# Patient Record
Sex: MALE | Race: WHITE | NOT HISPANIC OR LATINO | URBAN - METROPOLITAN AREA
[De-identification: names, ages, dates, MRNs, and addresses within clinical notes are randomized per-mention and may not be internally consistent; named-entity substitution may affect disease eponyms.]

---

## 2017-01-31 ENCOUNTER — EMERGENCY (EMERGENCY)
Facility: HOSPITAL | Age: 42
LOS: 0 days | Discharge: HOME | End: 2017-01-31
Attending: EMERGENCY MEDICINE
Payer: SELF-PAY

## 2017-01-31 VITALS
HEART RATE: 99 BPM | RESPIRATION RATE: 18 BRPM | WEIGHT: 156.97 LBS | HEIGHT: 67 IN | SYSTOLIC BLOOD PRESSURE: 139 MMHG | TEMPERATURE: 97 F | DIASTOLIC BLOOD PRESSURE: 103 MMHG | OXYGEN SATURATION: 97 %

## 2017-01-31 VITALS
TEMPERATURE: 97 F | OXYGEN SATURATION: 100 % | HEART RATE: 86 BPM | DIASTOLIC BLOOD PRESSURE: 95 MMHG | RESPIRATION RATE: 17 BRPM | SYSTOLIC BLOOD PRESSURE: 143 MMHG

## 2017-01-31 DIAGNOSIS — F10.129 ALCOHOL ABUSE WITH INTOXICATION, UNSPECIFIED: ICD-10-CM

## 2017-01-31 DIAGNOSIS — F19.10 OTHER PSYCHOACTIVE SUBSTANCE ABUSE, UNCOMPLICATED: ICD-10-CM

## 2017-01-31 DIAGNOSIS — R69 ILLNESS, UNSPECIFIED: ICD-10-CM

## 2017-01-31 DIAGNOSIS — R45.1 RESTLESSNESS AND AGITATION: ICD-10-CM

## 2017-01-31 DIAGNOSIS — F19.94 OTHER PSYCHOACTIVE SUBSTANCE USE, UNSPECIFIED WITH PSYCHOACTIVE SUBSTANCE-INDUCED MOOD DISORDER: ICD-10-CM

## 2017-01-31 LAB
ALBUMIN SERPL ELPH-MCNC: 4.4 G/DL — SIGNIFICANT CHANGE UP (ref 3.3–5)
ALP SERPL-CCNC: 38 U/L — LOW (ref 40–120)
ALT FLD-CCNC: 33 U/L — SIGNIFICANT CHANGE UP (ref 12–78)
AMPHET UR-MCNC: NEGATIVE — SIGNIFICANT CHANGE UP
ANION GAP SERPL CALC-SCNC: 8 MMOL/L — SIGNIFICANT CHANGE UP (ref 5–17)
APAP SERPL-MCNC: < 2 UG/ML (ref 10–30)
APTT BLD: 29.9 SEC — SIGNIFICANT CHANGE UP (ref 27.5–37.4)
AST SERPL-CCNC: 14 U/L — LOW (ref 15–37)
BARBITURATES UR SCN-MCNC: NEGATIVE — SIGNIFICANT CHANGE UP
BASOPHILS # BLD AUTO: 0.1 K/UL — SIGNIFICANT CHANGE UP (ref 0–0.2)
BASOPHILS NFR BLD AUTO: 1.5 % — SIGNIFICANT CHANGE UP (ref 0–2)
BENZODIAZ UR-MCNC: NEGATIVE — SIGNIFICANT CHANGE UP
BILIRUB SERPL-MCNC: 0.5 MG/DL — SIGNIFICANT CHANGE UP (ref 0.2–1.2)
BUN SERPL-MCNC: 7 MG/DL — SIGNIFICANT CHANGE UP (ref 7–23)
CALCIUM SERPL-MCNC: 8.4 MG/DL — LOW (ref 8.5–10.1)
CHLORIDE SERPL-SCNC: 107 MMOL/L — SIGNIFICANT CHANGE UP (ref 96–108)
CO2 SERPL-SCNC: 27 MMOL/L — SIGNIFICANT CHANGE UP (ref 22–31)
COCAINE METAB.OTHER UR-MCNC: POSITIVE — SIGNIFICANT CHANGE UP
CREAT SERPL-MCNC: 0.84 MG/DL — SIGNIFICANT CHANGE UP (ref 0.5–1.3)
EOSINOPHIL # BLD AUTO: 0.2 K/UL — SIGNIFICANT CHANGE UP (ref 0–0.5)
EOSINOPHIL NFR BLD AUTO: 2 % — SIGNIFICANT CHANGE UP (ref 0–6)
ETHANOL SERPL-MCNC: 197 MG/DL — HIGH (ref 0–10)
GLUCOSE SERPL-MCNC: 101 MG/DL — HIGH (ref 70–99)
HCT VFR BLD CALC: 41.4 % — SIGNIFICANT CHANGE UP (ref 39–50)
HGB BLD-MCNC: 15.5 G/DL — SIGNIFICANT CHANGE UP (ref 13–17)
INR BLD: 0.96 RATIO — SIGNIFICANT CHANGE UP (ref 0.88–1.16)
LYMPHOCYTES # BLD AUTO: 2.8 K/UL — SIGNIFICANT CHANGE UP (ref 1–3.3)
LYMPHOCYTES # BLD AUTO: 35.1 % — SIGNIFICANT CHANGE UP (ref 13–44)
MCHC RBC-ENTMCNC: 32.1 PG — SIGNIFICANT CHANGE UP (ref 27–34)
MCHC RBC-ENTMCNC: 37.3 GM/DL — HIGH (ref 32–36)
MCV RBC AUTO: 85.9 FL — SIGNIFICANT CHANGE UP (ref 80–100)
METHADONE UR-MCNC: NEGATIVE — SIGNIFICANT CHANGE UP
MONOCYTES # BLD AUTO: 0.7 K/UL — SIGNIFICANT CHANGE UP (ref 0–0.9)
MONOCYTES NFR BLD AUTO: 8.4 % — SIGNIFICANT CHANGE UP (ref 2–14)
NEUTROPHILS # BLD AUTO: 4.3 K/UL — SIGNIFICANT CHANGE UP (ref 1.8–7.4)
NEUTROPHILS NFR BLD AUTO: 53 % — SIGNIFICANT CHANGE UP (ref 43–77)
OPIATES UR-MCNC: NEGATIVE — SIGNIFICANT CHANGE UP
PCP SPEC-MCNC: SIGNIFICANT CHANGE UP
PCP UR-MCNC: NEGATIVE — SIGNIFICANT CHANGE UP
PLATELET # BLD AUTO: 283 K/UL — SIGNIFICANT CHANGE UP (ref 150–400)
POTASSIUM SERPL-MCNC: 3.9 MMOL/L — SIGNIFICANT CHANGE UP (ref 3.5–5.3)
POTASSIUM SERPL-SCNC: 3.9 MMOL/L — SIGNIFICANT CHANGE UP (ref 3.5–5.3)
PROT SERPL-MCNC: 7.8 GM/DL — SIGNIFICANT CHANGE UP (ref 6–8.3)
PROTHROM AB SERPL-ACNC: 10.8 SEC — SIGNIFICANT CHANGE UP (ref 10–13.1)
RBC # BLD: 4.82 M/UL — SIGNIFICANT CHANGE UP (ref 4.2–5.8)
RBC # FLD: 11 % — SIGNIFICANT CHANGE UP (ref 11–15)
SALICYLATES SERPL-MCNC: <1.7 MG/DL — LOW (ref 2.8–20)
SODIUM SERPL-SCNC: 142 MMOL/L — SIGNIFICANT CHANGE UP (ref 135–145)
THC UR QL: NEGATIVE — SIGNIFICANT CHANGE UP
WBC # BLD: 8.1 K/UL — SIGNIFICANT CHANGE UP (ref 3.8–10.5)
WBC # FLD AUTO: 8.1 K/UL — SIGNIFICANT CHANGE UP (ref 3.8–10.5)

## 2017-01-31 PROCEDURE — 99284 EMERGENCY DEPT VISIT MOD MDM: CPT

## 2017-01-31 PROCEDURE — 90792 PSYCH DIAG EVAL W/MED SRVCS: CPT

## 2017-01-31 RX ORDER — SODIUM CHLORIDE 9 MG/ML
1000 INJECTION INTRAMUSCULAR; INTRAVENOUS; SUBCUTANEOUS ONCE
Qty: 0 | Refills: 0 | Status: COMPLETED | OUTPATIENT
Start: 2017-01-31 | End: 2017-01-31

## 2017-01-31 RX ADMIN — SODIUM CHLORIDE 1000 MILLILITER(S): 9 INJECTION INTRAMUSCULAR; INTRAVENOUS; SUBCUTANEOUS at 09:05

## 2017-01-31 RX ADMIN — Medication 2 MILLIGRAM(S): at 09:04

## 2017-01-31 NOTE — ED BEHAVIORAL HEALTH ASSESSMENT NOTE - DESCRIPTION (FIRST USE, LAST USE, QUANTITY, FREQUENCY, DURATION)
describes himself as a social drinker, denies any history of current past abuse, last drink in AM while on plane

## 2017-01-31 NOTE — ED ADULT TRIAGE NOTE - CHIEF COMPLAINT QUOTE
Sent by port authority police for agitated behavior while on airplane. Patient just traveled from [place] to [place]   Patient admits to having 2 glasses of wine and Ambien.

## 2017-01-31 NOTE — ED BEHAVIORAL HEALTH ASSESSMENT NOTE - SUMMARY
Pt is a yo single, domiciled, employed, WM with a history of anxiety and no previous inpt psychiatric hospitalization. Pt was BIB EMS secondary to bizarre, agitated behavior while on his flight from [place] to [facility] - he was apprehended in [facility] and presents for an evaluation. In ER, initially pt   requiring prn medications. However, upon evaluation with writer, pt is calm and cooperative. He denies having any recollection of events that lead to being brought into hospital.  Pt l Pt is a 42 yo single, domiciled, employed, WM with a history of anxiety and no previous inpt psychiatric hospitalization. Pt was BIB EMS secondary to bizarre, agitated behavior while on his flight from [place] to [facility], additionally he was reported to be aggressive towards other passengers in the airport- he was apprehended in [facility] by [employer] and presented to [facility] ER for an evaluation. In ER, initially pt was crying, very anxious  requiring prn medication - IV Ativan 2 mg x 1 dose was given. However, upon evaluation with writer, pt is calm and cooperative. He denies having any recollection of events that lead to being brought into hospital.  Pt has no history of s/h i/i/p - no history of SA, currently denies any plans to harm self/others.  It appears that combination of etoh and Ambien altered his behavior. Pt encouraged to not consume etoh while on that or any other medication. He does not present an acute danger to self/others and acute inpt psychiatric hospitalization is not warranted.

## 2017-01-31 NOTE — ED PROVIDER NOTE - PROGRESS NOTE DETAILS
Dr. Peace psychiatrist cleared pt Pt is alert and oriented x 3 smiling denies any harmful thoughts to himself or others, denies headache, dizziness, sob, chest pain, nausea, vomiting, fever, chills, abd pain.

## 2017-01-31 NOTE — ED BEHAVIORAL HEALTH ASSESSMENT NOTE - HPI (INCLUDE ILLNESS QUALITY, SEVERITY, DURATION, TIMING, CONTEXT, MODIFYING FACTORS, ASSOCIATED SIGNS AND SYMPTOMS)
Pt is a yo single, domiciled, employed, WM with a history of anxiety and no previous inpt psychiatric hospitalization. Pt was BIB EMS secondary to bizarre, agitated behavior while on his flight from [place] to [facility] - he was apprehended in [facility] and presents for an evaluation. In ER, pt remained agitated, crying, confused and was given.  Pt feel asleep.  Upon evaluation, pt states that he has no recollection of events that lead him to be brought to the hospital. He states that he drank a few glasses of wine during the flight and may have had 1 or 2 ambiens in order to sleep.  States that he believes that flight attendants were on edge as he did "nothing wrong." He admits to a history of anxiety - states that he has never been prescribed an SSRI and treats his anxiety with yoga and exercise. States that he sees a psychiatrist "once in a blue moon" in order to obtain Ambien which he took as needed. He denies current s/s of flor/depression/psychosis/anxiety. Denies s/h i/i/p. Denies current/past substance use/abuse. Denies legal history.  Initially reluctant to have writer contact a collateral source as he believes current episode is out of character and he doesn't wish to alarm family members, however he ultimately gave writer name/number of friend David Dennis - childhood friend since age 12  - reports that she speaks to pt often. She confirms that Ambien is prescribed by his psychiatrist. States that pt has no history of psych hospitalizations. notes that he works and is a "good steven." She expressed that at times pt drinks too much, however reported that he has not history of treatment for etoh and to her knowledge no history of DWI/DUI.  Note that when writer questioned pt about etoh, he denied drinking in excess - reported that he doesn't have a drink first thing in the morning, denied that drinking in anyway interferes with his life, denies requiring detox or any s/s of withdrawal. Pt is a 40 yo single, domiciled, employed, WM with a history of anxiety and no previous inpt psychiatric hospitalization. Pt was BIB EMS secondary to bizarre, agitated behavior while on his flight from [place] to [facility], additionally he was reported to be aggressive towards other passengers in the airport- he was apprehended in [facility] by [employer] and presented to [facility] ER for an evaluation. In ER, initially pt was crying, very anxious  requiring prn medication - IV Ativan 2 mg x 1 dose was given.  Pt fell asleep.  Upon evaluation, pt states that he has no recollection of events that lead him to be brought to the hospital. He states that he drank a few glasses of wine during the flight and may have had 1 or 2 ambiens in order to sleep.  States that he believes that flight attendants were on edge as he did "nothing wrong." He admits to a history of anxiety - states that he has never been prescribed an SSRI and treats his anxiety with yoga and exercise. States that he sees a psychiatrist "once in a blue moon" in order to obtain Ambien which he took as needed. He denies current s/s of flor/depression/psychosis/anxiety. Denies s/h i/i/p. Denies current/past substance use/abuse. Denies legal history.  Initially reluctant to have writer contact a collateral source as he believes current episode is out of character and he doesn't wish to alarm family members, however he ultimately gave writer name/number of friend David Dennis - childhood friend since age 12  - reports that she speaks to pt often. She confirms that Ambien is prescribed by his psychiatrist. States that pt has no history of psych hospitalizations. notes that he works and is a "good steven." She expressed that at times pt drinks too much, however reported that he has not history of treatment for etoh and to her knowledge no history of DWI/DUI.  Note that when writer questioned pt about etoh, he denied drinking in excess - reported that he doesn't have a drink first thing in the morning, denied that drinking in anyway interferes with his life, denies requiring detox or any s/s of withdrawal.

## 2017-01-31 NOTE — ED BEHAVIORAL HEALTH ASSESSMENT NOTE - OTHER PAST PSYCHIATRIC HISTORY (INCLUDE DETAILS REGARDING ONSET, COURSE OF ILLNESS, INPATIENT/OUTPATIENT TREATMENT)
reports a history of anxiety which he treats with yoga and exercise. States that he is on Ambien for insomnia - 5 mg.

## 2017-01-31 NOTE — ED BEHAVIORAL HEALTH ASSESSMENT NOTE - DESCRIPTION
Initially agitated requiring prn medications. denies pt was born [place], he is an only child. Completed college and moved in [place], [place] 5 years ago - currently employed as a manager at a seasonal restaurant

## 2017-01-31 NOTE — ED BEHAVIORAL HEALTH ASSESSMENT NOTE - SUICIDE PROTECTIVE FACTORS
Responsibility to family and others/Identifies reasons for living/Future oriented/Supportive social network or family

## 2017-01-31 NOTE — ED ADULT NURSE NOTE - OBJECTIVE STATEMENT
patient received, agitated, crying, very anxious, patient somewhat uncooperative. verbalized increased anxiety "a lot of things happening to the world" placed on 1:1 denies hurting himself or others. no suicidal thoughts.

## 2017-01-31 NOTE — ED ADULT NURSE NOTE - CHIEF COMPLAINT QUOTE
Sent by port authority police for agitated behavior while on airplane. Patient just traveled from [place] to [place]   Patient admits to having 2 glasses of wine and Ambien.

## 2017-01-31 NOTE — ED PROVIDER NOTE - CONSTITUTIONAL, MLM
normal... Well appearing, well nourished, awake, alert, oriented to person, place, time/situation and in no apparent distress. Speaking in clear full sentences tearful

## 2017-01-31 NOTE — ED BEHAVIORAL HEALTH ASSESSMENT NOTE - RISK ASSESSMENT
pt presents a low risk of self harm - he has no history of s/h i/i/p.  no hx of SA.  He denies substance abuse, however there is a question of misuse of medications with alcohol. Multiple protective factors - current employment, family/friend supports

## 2017-01-31 NOTE — ED PROVIDER NOTE - CARE PLAN
Principal Discharge DX:	Alcohol intoxication Principal Discharge DX:	Alcohol intoxication  Secondary Diagnosis:	Substance abuse

## 2017-01-31 NOTE — ED BEHAVIORAL HEALTH ASSESSMENT NOTE - NS ED [facility] ED COURSE PSYCHIATRIC MEDICATION GIVEN
Involuntary Intramuscular (indication, name, dose, time) Voluntary Intramuscular (indication, name, dose, time)

## 2018-10-01 NOTE — ED PROVIDER NOTE - NS ED MD EM SELECTION

## 2020-01-30 NOTE — ED PROVIDER NOTE - OBJECTIVE STATEMENT
Attempted to reach patient for: No contact made with patient since end of November. She has returned calls to clinic multiple times, but not answering return calls or directly calling [facility]. Unable to coordinate care due to lack of engagement.    Outcome:no answer; voicemail left    Next Follow Up:Will keep encounter open for 3 days, then close case    VM left with patient indicating need for active engagement for ongoing care coordination. Plan for discharge/close.      41 years old male here by the port authority from the air [facility]\"" because pt was aggressive towards  when the flight landed. According to the [employer] pt was drinking luke and taking a pill Pt sts he took ambient with wine. According to the ems pt was also aggressive toward other passenger at the air port. Pt here sts he does not remember what happened but sts he does not want to harm any one or himself. Pt is requesting ativan to calm himself. Pt sts he flew here from [place] arrived here this morning. Pt denies hx of psychiatric problem, dizziness, headache, cough, sob, chest pain, nausea, vomiting, fever, chills, abd pain. Pt is crying sts he does not know he is here with \[facility]\"" authority and he is feeling very anxious.

## 2020-05-05 NOTE — ED PROVIDER NOTE - SECONDARY DIAGNOSIS.
Personalized Preventive Plan for Tanner Ready - 5/5/2020  Medicare offers a range of preventive health benefits. Some of the tests and screenings are paid in full while other may be subject to a deductible, co-insurance, and/or copay. Some of these benefits include a comprehensive review of your medical history including lifestyle, illnesses that may run in your family, and various assessments and screenings as appropriate. After reviewing your medical record and screening and assessments performed today your provider may have ordered immunizations, labs, imaging, and/or referrals for you. A list of these orders (if applicable) as well as your Preventive Care list are included within your After Visit Summary for your review. Other Preventive Recommendations:    · A preventive eye exam performed by an eye specialist is recommended every 1-2 years to screen for glaucoma; cataracts, macular degeneration, and other eye disorders. · A preventive dental visit is recommended every 6 months. · Try to get at least 150 minutes of exercise per week or 10,000 steps per day on a pedometer . · Order or download the FREE \"Exercise & Physical Activity: Your Everyday Guide\" from [employer]. Call 7-457.318.8927 or search [employer] online. · You need 6829-1825 mg of calcium and 1981-3404 IU of vitamin D per day. It is possible to meet your calcium requirement with diet alone, but a vitamin D supplement is usually necessary to meet this goal.  · When exposed to the sun, use a sunscreen that protects against both UVA and UVB radiation with an SPF of 30 or greater. Reapply every 2 to 3 hours or after sweating, drying off with a towel, or swimming. · Always wear a seat belt when traveling in a car. Always wear a helmet when riding a bicycle or motorcycle.
Substance abuse